# Patient Record
Sex: MALE | Race: OTHER | HISPANIC OR LATINO
[De-identification: names, ages, dates, MRNs, and addresses within clinical notes are randomized per-mention and may not be internally consistent; named-entity substitution may affect disease eponyms.]

---

## 2018-07-25 PROBLEM — Z00.00 ENCOUNTER FOR PREVENTIVE HEALTH EXAMINATION: Status: ACTIVE | Noted: 2018-07-25

## 2018-09-12 ENCOUNTER — APPOINTMENT (OUTPATIENT)
Dept: VASCULAR SURGERY | Facility: CLINIC | Age: 28
End: 2018-09-12
Payer: MEDICAID

## 2018-09-12 VITALS
DIASTOLIC BLOOD PRESSURE: 70 MMHG | HEIGHT: 72 IN | BODY MASS INDEX: 36.57 KG/M2 | WEIGHT: 270 LBS | SYSTOLIC BLOOD PRESSURE: 120 MMHG

## 2018-09-12 DIAGNOSIS — I83.893 VARICOSE VEINS OF BILATERAL LOWER EXTREMITIES WITH OTHER COMPLICATIONS: ICD-10-CM

## 2018-09-12 PROCEDURE — 93970 EXTREMITY STUDY: CPT

## 2018-09-12 PROCEDURE — 99203 OFFICE O/P NEW LOW 30 MIN: CPT

## 2019-08-12 ENCOUNTER — OUTPATIENT (OUTPATIENT)
Dept: OUTPATIENT SERVICES | Facility: HOSPITAL | Age: 29
LOS: 1 days | Discharge: HOME | End: 2019-08-12

## 2019-08-12 DIAGNOSIS — Z00.00 ENCOUNTER FOR GENERAL ADULT MEDICAL EXAMINATION WITHOUT ABNORMAL FINDINGS: ICD-10-CM

## 2019-08-12 DIAGNOSIS — E66.9 OBESITY, UNSPECIFIED: ICD-10-CM

## 2019-10-01 ENCOUNTER — APPOINTMENT (OUTPATIENT)
Dept: UROLOGY | Facility: CLINIC | Age: 29
End: 2019-10-01
Payer: MEDICAID

## 2019-10-01 DIAGNOSIS — Z30.2 ENCOUNTER FOR STERILIZATION: ICD-10-CM

## 2019-10-01 PROCEDURE — 99204 OFFICE O/P NEW MOD 45 MIN: CPT

## 2019-10-01 NOTE — PHYSICAL EXAM
[General Appearance - In No Acute Distress] : no acute distress [Edema] : no peripheral edema [] : no respiratory distress [Abdomen Soft] : soft [Abdomen Hernia] : no hernia was discovered [Penis Abnormality] : normal uncircumcised penis [Scrotum] : the scrotum was normal [Epididymis] : the epididymides were normal [Testes Tenderness] : no tenderness of the testes [FreeTextEntry1] : Normal vas bilaterally [Testes Mass (___cm)] : there were no testicular masses [Normal Station and Gait] : the gait and station were normal for the patient's age

## 2019-10-01 NOTE — ASSESSMENT
[FreeTextEntry1] : Regarding low testosterone risks benefits alternatives fully discussed with patient. Recommend repeat with pituitary hormones. Patient understands the risk of testosterone replacement including thromboembolic phenomena and growth factor effects.  He does not want any replacement therapy.\par \par Fully discussed with patient vasectomy at his request. Risks benefits and alternatives discussed including hematoma, infection, reoperation, permanency, need for postoperative control and semen analysis, reattachment of the vas and fertility spontaneously, chronic pain. Patient discussed with me the possibility that he may change her spine and request surgical reattachment(vasovasotomy). I discussed that vasovasotomy is not 100% and success rates diminished as time from vasectomy increases. I do not recommend him to undergo any vasectomy if he thinks he may change his mind in the future and that he should continue a permanent form of birth control although reversal can be done it is not nearly a 100%.

## 2019-10-01 NOTE — REVIEW OF SYSTEMS
[Fever] : no fever [Shortness Of Breath] : no shortness of breath [Chest Pain] : no chest pain [Wheezing] : no wheezing [Cough] : no cough [Constipation] : no constipation [Abdominal Pain] : no abdominal pain [Dysuria] : no dysuria [Diarrhea] : no diarrhea [Confused] : no confusion

## 2019-10-01 NOTE — HISTORY OF PRESENT ILLNESS
[FreeTextEntry1] : Spouse was around for discussion via face time.\par \par Patient complains of low T. with testosterone of 150 done by primary medical doctor although I do not have the results to review. The purpose of the blood test was because the patient had low energy, emotional lability, decrease strength. He does not believe any pituitary hormones were checked.\par \par He has 2 children last born 2 years ago and would like information about vasectomy [Urinary Incontinence] : no urinary incontinence [Urinary Retention] : no urinary retention [Urinary Urgency] : no urinary urgency [Urinary Frequency] : no urinary frequency [Straining] : no straining [Nocturia] : no nocturia [Weak Stream] : no weak stream [Dysuria] : no dysuria [Intermittency] : no intermittency [Hematuria - Gross] : no gross hematuria

## 2019-10-22 ENCOUNTER — OUTPATIENT (OUTPATIENT)
Dept: OUTPATIENT SERVICES | Facility: HOSPITAL | Age: 29
LOS: 1 days | Discharge: HOME | End: 2019-10-22

## 2019-10-22 DIAGNOSIS — Z30.2 ENCOUNTER FOR STERILIZATION: ICD-10-CM

## 2019-10-23 LAB
FSH SERPL-MCNC: 4.3 IU/L
LH SERPL-ACNC: 6.7 IU/L
PROLACTIN SERPL-MCNC: 8.5 NG/ML

## 2019-10-28 LAB
TESTOST BND SERPL-MCNC: 7.8 PG/ML
TESTOST SERPL-MCNC: 189.2 NG/DL

## 2019-11-05 ENCOUNTER — APPOINTMENT (OUTPATIENT)
Dept: UROLOGY | Facility: CLINIC | Age: 29
End: 2019-11-05
Payer: MEDICAID

## 2019-11-05 VITALS — BODY MASS INDEX: 36.57 KG/M2 | WEIGHT: 270 LBS | HEIGHT: 72 IN

## 2019-11-05 PROCEDURE — 99213 OFFICE O/P EST LOW 20 MIN: CPT

## 2019-11-05 NOTE — REVIEW OF SYSTEMS
[Fever] : no fever [Chest Pain] : no chest pain [Shortness Of Breath] : no shortness of breath [Constipation] : no constipation [Diarrhea] : no diarrhea [Confused] : no confusion

## 2019-11-05 NOTE — HISTORY OF PRESENT ILLNESS
[FreeTextEntry1] : She was found to have normal pituitary hormones but little free and total testosterone. It was 189 this time. He continues to have low energy and emotional lability

## 2019-11-05 NOTE — ASSESSMENT
[FreeTextEntry1] : We discussed risks benefits and alternative regarding testosterone replacement. Risks include thromboembolic phenomena, tumor minerva, BPH. He prefers no treatment. He will try with exercise and diet. He wants to proceed with vasectomy

## 2019-12-09 ENCOUNTER — APPOINTMENT (OUTPATIENT)
Dept: UROLOGY | Facility: HOSPITAL | Age: 29
End: 2019-12-09